# Patient Record
Sex: MALE | Race: OTHER | NOT HISPANIC OR LATINO | URBAN - METROPOLITAN AREA
[De-identification: names, ages, dates, MRNs, and addresses within clinical notes are randomized per-mention and may not be internally consistent; named-entity substitution may affect disease eponyms.]

---

## 2022-02-20 ENCOUNTER — EMERGENCY (EMERGENCY)
Facility: HOSPITAL | Age: 52
LOS: 1 days | Discharge: HOME | End: 2022-02-20
Attending: EMERGENCY MEDICINE
Payer: SELF-PAY

## 2022-02-20 VITALS
WEIGHT: 198.42 LBS | OXYGEN SATURATION: 95 % | RESPIRATION RATE: 17 BRPM | SYSTOLIC BLOOD PRESSURE: 125 MMHG | HEIGHT: 69 IN | TEMPERATURE: 98 F | HEART RATE: 96 BPM | DIASTOLIC BLOOD PRESSURE: 68 MMHG

## 2022-02-20 LAB — SARS-COV-2 RNA SPEC QL NAA+PROBE: SIGNIFICANT CHANGE UP

## 2022-02-20 PROCEDURE — 71046 X-RAY EXAM CHEST 2 VIEWS: CPT

## 2022-02-20 PROCEDURE — 99285 EMERGENCY DEPT VISIT HI MDM: CPT | Mod: 25

## 2022-02-20 PROCEDURE — 99284 EMERGENCY DEPT VISIT MOD MDM: CPT

## 2022-02-20 PROCEDURE — 71046 X-RAY EXAM CHEST 2 VIEWS: CPT | Mod: 26

## 2022-02-20 PROCEDURE — 87635 SARS-COV-2 COVID-19 AMP PRB: CPT

## 2022-02-20 RX ORDER — AZITHROMYCIN 500 MG/1
1 TABLET, FILM COATED ORAL
Qty: 4 | Refills: 0
Start: 2022-02-20 | End: 2022-02-23

## 2022-02-20 RX ORDER — AZITHROMYCIN 500 MG/1
500 TABLET, FILM COATED ORAL ONCE
Refills: 0 | Status: COMPLETED | OUTPATIENT
Start: 2022-02-20 | End: 2022-02-20

## 2022-02-20 RX ADMIN — AZITHROMYCIN 500 MILLIGRAM(S): 500 TABLET, FILM COATED ORAL at 13:04

## 2022-02-20 NOTE — ED PROVIDER NOTE - PATIENT PORTAL LINK FT
You can access the FollowMyHealth Patient Portal offered by [facility] by registering at the following website: [email or web address]. By joining [employer]’s FollowMyHealth portal, you will also be able to view your health information using other applications (apps) compatible with our system.

## 2022-02-20 NOTE — ED ADULT NURSE NOTE - OBJECTIVE STATEMENT
pt is here for weakness.  pt stated that cough x 1 week, c/o weakness and headache, denied chest pain or sob, denied fever or sob,

## 2022-02-20 NOTE — ED PROVIDER NOTE - CLINICAL SUMMARY MEDICAL DECISION MAKING FREE TEXT BOX
Patient has clinical PNA. Xray showed a retrocardiac opacification. Get oral antibiotics and discharge.

## 2022-02-20 NOTE — ED PROVIDER NOTE - NSFOLLOWUPINSTRUCTIONS_ED_ALL_ED_FT
IMPORTANT INSTRUCTIONS FROM Dr. FAYE:    Please follow up with your personal medical doctor in 24-48 hours.   Bring results from today to your visit.    Antibiotics as prescribed.    If you were advised to take any medications - be sure to review the package insert.    If your symptoms change, get worse or if you have any new symptoms, come to the ER right away.  If you have any questions, call the ER at the phone number on this page.     You were tested today for coronavirus. We have attached a packet of information with your discharge paperwork which you should read. Current guidelines are that you and your household should self- quarantine (do not go out - NO TAXI OR SUBWAY). Everyone in the house should stay in for a minimum of 7 days but you must also remain quarantined for 72 hours beyond your last measured fever.     The most serious complication of coronavirus is low oxygen. Your oxygen was ok today but your symptoms and condition may change or deteriorate. It is important that you watch your breathing. If you have any shortness of breath, you must return to the Emergency Department right away to be evaluated again. Delay in return can be very dangerous and you must be vigilant.    Testing results for Covid-19 / novel coronavirus should be text-ed or emailed in 1-2 days. There is a phone number in the attached packet with phone number of our hotline which can provide you with results and more information. Call if you do not hear back or have questions about your illness.

## 2022-02-20 NOTE — ED ADULT NURSE NOTE - NS ED NURSE DC INFO COMPLEXITY
Office note 10/26/2020 from Dr. Lua, [facility], sent to scan   Simple: Patient demonstrates quick and easy understanding/Verbalized Understanding

## 2022-02-20 NOTE — ED PROVIDER NOTE - OBJECTIVE STATEMENT
51 y.o male presents with fever, fatigue, shortness of breath, and non-productive cough since Monday. Patient states he had no exposure to covid and is covid vaccinated x2. He recently traveled from [place] and has been in the [place] since [place].

## 2022-02-20 NOTE — ED PROVIDER NOTE - NS ED ATTENDING STATEMENT MOD
complaints from previous session. Family / Caregiver Present: (P) No  Referring Practitioner: Edis Thurston  Subjective: (P) Pt lying in bed watching TV  Comments: (P) Agreeable to interventions, \"just relaxing\"  Pain Screening  Patient Currently in Pain: (P) Denies    Pre Treatment Pain Screening  Pain at present: (P) 2  Comments / Details: (P) R lateral rib area with movement    Orientation     Cognition      Objective    Bed Mobility: SBA supine <> sit, HOB elevated when sitting up, uses rail with both transfers. Transfers  Sit to Stand: (P) Contact guard assistance  Stand to sit: (P) Contact guard assistance; Stand by assistance  Stand Pivot Transfers: (P) Contact guard assistance  Comment: (P) AD for support, moderate effort to stand, intermittent cues for hand placement. Ambulation  Ambulation?: (P) Yes  Ambulation 1  Surface: (P) level tile  Device: (P) Rolling Walker  Assistance: (P) Contact guard assistance  Quality of Gait: (P) upright posture, improved naina  Gait Deviations: (P) Decreased step length; Decreased head and trunk rotation  Distance: (P) 120'  Comments: (P) reports LE fatigue afterwards     Balance  Posture: (P) Good  Sitting - Static: (P) Good  Sitting - Dynamic: (P) Fair; +  Standing - Static: (P) Fair; +; Good; -  Standing - Dynamic: (P) Fair; +  Exercises  Hip Flexion: (P) 2 x 10  Hip Abduction: (P) 2 x 10  Knee Long Arc Quad: (P) 2 x 10  Ankle Pumps: (P) x 20- limited on R d/t fused ankle  Core Strengthening: (P) trunk flex/ext x 3 H5  Comments: (P) ther ex performed in recliner, visual demo and v/c's for correct direction of movement.           Other Activities: (P) Other (see comment)  Comment: (P) bed mobility              G-Code     OutComes Score                                                     AM-PAC Score             Goals  Short term goals  Time Frame for Short term goals: (P) 3-5 days  Short term goal 1: (P) mod assist +1 rolling  Short term goal 2: (P) Goal Met  Short term goal 3: (P) Stand at bedside with with assist of 2 for 10 min  Short term goal 4: (P) Particpate in LE ex to support ambulation  Long term goals  Time Frame for Long term goals : (P) 5-15 days  Long term goal 1: (P) Amb with ww x50', indep  Long term goal 2: (P) Able to get out flat  bed on right with ww  Long term goal 3: (P) Be able to use commode with ww, indep  Long term goal 4: (P) Perform at least one step  Long term goal 5: (P) Written HEP  Patient Goals   Patient goals : (P) Return home to his dog    Plan    Plan  Times per week: (P) 5-7  Times per day:  (1-2)  Plan weeks: (P) 2  Current Treatment Recommendations: (P) Transfer Training, Cognitive Reorientation, Pain Management, Gait Training, Home Exercise Program, Safety Education & Training  Safety Devices  Type of devices: (P) All fall risk precautions in place, Bed alarm in place, Call light within reach  Restraints  Initially in place: (P) No     Therapy Time   Individual Concurrent Group Co-treatment   Time In  10:30 am         Time Out  11:30 am         Minutes  [address], PTA  License and Documentation [address] Number: [phone] Attending Only

## 2025-02-04 NOTE — ED PROVIDER NOTE - SCRIBE NAME
Marty Culp ([facility]ly [facility], [facility]) 71-year-old man past medical history as documented with complaints of 6 weeks of weakness, anorexia and poor p.o. intake.  Patient with increasing creatinine over the past few weeks.  In ED, patient with MARIANNE, had,.  Nephrology consulted.  Patient remained hypotensive in the ED despite IV fluids.  Unknown etiology for shock.  EKG with A-fib and no ischemia.  Chest x-ray with no acute pathology.  CT A/P with no acute pathology.  Critical care consulted and patient admitted to the ICU.